# Patient Record
(demographics unavailable — no encounter records)

---

## 2025-05-28 NOTE — PHYSICAL EXAM
[de-identified] : Neurologic: normal sensation, normal mood and affect, orientated and able to communicate   Constitutional: well developed and well nourished   Left Shoulder: Pop-eyed deformity

## 2025-05-28 NOTE — DISCUSSION/SUMMARY
[de-identified] :  Reviewed all MRI images with patient, interpretation was provided. Discussed all treatment options with patient, including surgery and conservative management. Email provided, will reach out with his decision. Patient will follow up as needed.     ***will find time at matter for surgery, awaits email.  ----------------------------------------------- Home Exercise The patient is instructed on a home exercise program.  RAVI DYER Acting as a Scribe for Dr. Marvin KHOURY, Ravi Dyer, attest that this documentation has been prepared under the direction and in the presence of Provider Chano Wong MD.  Activity Modification The patient was advised to modify their activities.  Dx / Natural History The patient was advised of the diagnosis.  The natural history of the pathology was explained in full to the patient in layman's terms.  Several different treatment options were discussed and explained in full to the patient including the risks and benefits of both surgical and non-surgical treatments.  All questions and concerns were answered.  Pain Guide Activities The patient was advised to let pain guide the gradual advancement of activities.  RICE I explained to the patient that rest, ice, compression, and elevation would benefit them.  They may return to activity after follow-up or when they no longer have any pain.  The patient's current medication management of their orthopedic diagnosis was reviewed today: (1) We discussed a comprehensive treatment plan that included possible pharmaceutical management involving the use of prescription strength medications including but not limited to options such as oral Naprosyn 500mg BID, once daily Meloxicam 15 mg, or 500-650 mg Tylenol versus over the counter oral medications and topical prescription NSAID Pennsaid vs over the counter Voltaren gel. (2) There is a moderate risk of morbidity with further treatment, especially from use of prescription strength medications and possible side effects of these medications which include upset stomach with oral medications, skin reactions to topical medications and cardiac/renal issues with long term use. (3) I recommended that the patient follow-up with their medical physician to discuss any significant specific potential issues with long term medication use such as interactions with current medications or with exacerbation of underlying medical comorbidities. (4) The benefits and risks associated with use of injectable, oral or topical, prescription and over the counter anti-inflammatory medications were discussed with the patient. The patient voiced understanding of the risks including but not limited to bleeding, stroke, kidney dysfunction, heart disease, and were referred to the black box warning label for further information.

## 2025-05-28 NOTE — PHYSICAL EXAM
[de-identified] : Neurologic: normal sensation, normal mood and affect, orientated and able to communicate   Constitutional: well developed and well nourished   Left Shoulder: Pop-eyed deformity
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
## 2025-05-28 NOTE — DATA REVIEWED
[FreeTextEntry1] :  05/19/25  MRI Left Shoulder: Chronic humeral neck fracture deformity. Findings most consistent with retracted proximal long head biceps tendon tear/rupture as described above with resultant defect as well as slight edema associated with the long head biceps brachii muscle which may reflect denervation edema. Minimal insertional supraspinatus tendinosis. Other findings as above.
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
## 2025-05-28 NOTE — DISCUSSION/SUMMARY
[de-identified] :  Reviewed all MRI images with patient, interpretation was provided. Discussed all treatment options with patient, including surgery and conservative management. Email provided, will reach out with his decision. Patient will follow up as needed.     ***will find time at matter for surgery, awaits email.  ----------------------------------------------- Home Exercise The patient is instructed on a home exercise program.  RAVI DYER Acting as a Scribe for Dr. Marvin KHOURY, Ravi Dyer, attest that this documentation has been prepared under the direction and in the presence of Provider Chano Wong MD.  Activity Modification The patient was advised to modify their activities.  Dx / Natural History The patient was advised of the diagnosis.  The natural history of the pathology was explained in full to the patient in layman's terms.  Several different treatment options were discussed and explained in full to the patient including the risks and benefits of both surgical and non-surgical treatments.  All questions and concerns were answered.  Pain Guide Activities The patient was advised to let pain guide the gradual advancement of activities.  RICE I explained to the patient that rest, ice, compression, and elevation would benefit them.  They may return to activity after follow-up or when they no longer have any pain.  The patient's current medication management of their orthopedic diagnosis was reviewed today: (1) We discussed a comprehensive treatment plan that included possible pharmaceutical management involving the use of prescription strength medications including but not limited to options such as oral Naprosyn 500mg BID, once daily Meloxicam 15 mg, or 500-650 mg Tylenol versus over the counter oral medications and topical prescription NSAID Pennsaid vs over the counter Voltaren gel. (2) There is a moderate risk of morbidity with further treatment, especially from use of prescription strength medications and possible side effects of these medications which include upset stomach with oral medications, skin reactions to topical medications and cardiac/renal issues with long term use. (3) I recommended that the patient follow-up with their medical physician to discuss any significant specific potential issues with long term medication use such as interactions with current medications or with exacerbation of underlying medical comorbidities. (4) The benefits and risks associated with use of injectable, oral or topical, prescription and over the counter anti-inflammatory medications were discussed with the patient. The patient voiced understanding of the risks including but not limited to bleeding, stroke, kidney dysfunction, heart disease, and were referred to the black box warning label for further information.